# Patient Record
(demographics unavailable — no encounter records)

---

## 2025-04-25 NOTE — REASON FOR VISIT
[Initial Consultation] : an initial consultation [Meatal stenosis] : meatal stenosis [PCP] : ~pcp~ [Parents] : parents

## 2025-05-15 NOTE — CONSULT LETTER
[FreeTextEntry1] : Dear Dr. HARJEET MARCIAL ,  I had the pleasure of consulting on LYNN FLORES today.  Below is my note regarding the office visit today.  Thank you so very much for allowing me to participate in LYNN's  care.  Please don't hesitate to call me should any questions or issues arise .  Sincerely,   Eric March MD, FACS, [facility] Chief, Pediatric Urology Professor of Urology and Pediatrics [employer] [facility] [employer]  President, [employer] - [place] Section Past-President, Societies for Pediatric Urology

## 2025-05-15 NOTE — HISTORY OF PRESENT ILLNESS
[TextBox_4] :  LYNN  is here for consultation today.  He is a healthy boy who was born at term and subsequently circumcised.  Since he has been toilet trained, his urinary stream has been progressively superiorly deflected.  He needs to contort his penis to try to get the stream to enter the toilet.  The stream is also narrow and he takes a while to empty his bladder. No UTIs or hematuria.